# Patient Record
Sex: MALE | Race: WHITE | NOT HISPANIC OR LATINO | Employment: OTHER | URBAN - METROPOLITAN AREA
[De-identification: names, ages, dates, MRNs, and addresses within clinical notes are randomized per-mention and may not be internally consistent; named-entity substitution may affect disease eponyms.]

---

## 2024-10-17 ENCOUNTER — OFFICE VISIT (OUTPATIENT)
Age: 44
End: 2024-10-17
Payer: COMMERCIAL

## 2024-10-17 ENCOUNTER — APPOINTMENT (OUTPATIENT)
Age: 44
End: 2024-10-17
Payer: COMMERCIAL

## 2024-10-17 VITALS
HEIGHT: 70 IN | BODY MASS INDEX: 25.05 KG/M2 | HEART RATE: 83 BPM | DIASTOLIC BLOOD PRESSURE: 87 MMHG | SYSTOLIC BLOOD PRESSURE: 127 MMHG | WEIGHT: 175 LBS

## 2024-10-17 DIAGNOSIS — M47.816 LUMBAR SPONDYLOSIS: ICD-10-CM

## 2024-10-17 DIAGNOSIS — S39.92XA INJURY OF LOW BACK, INITIAL ENCOUNTER: ICD-10-CM

## 2024-10-17 DIAGNOSIS — M54.50 ACUTE BILATERAL LOW BACK PAIN WITHOUT SCIATICA: ICD-10-CM

## 2024-10-17 DIAGNOSIS — M54.50 ACUTE BILATERAL LOW BACK PAIN WITHOUT SCIATICA: Primary | ICD-10-CM

## 2024-10-17 PROCEDURE — 99204 OFFICE O/P NEW MOD 45 MIN: CPT | Performed by: STUDENT IN AN ORGANIZED HEALTH CARE EDUCATION/TRAINING PROGRAM

## 2024-10-17 PROCEDURE — 72100 X-RAY EXAM L-S SPINE 2/3 VWS: CPT

## 2024-10-17 RX ORDER — METHYLPREDNISOLONE 4 MG/1
TABLET ORAL
Qty: 1 EACH | Refills: 0 | Status: SHIPPED | OUTPATIENT
Start: 2024-10-17

## 2024-10-17 RX ORDER — LIDOCAINE 50 MG/G
1 PATCH TOPICAL DAILY
Qty: 30 PATCH | Refills: 0 | Status: SHIPPED | OUTPATIENT
Start: 2024-10-17 | End: 2024-11-16

## 2024-10-17 RX ORDER — CYCLOBENZAPRINE HCL 5 MG
5 TABLET ORAL
Qty: 30 TABLET | Refills: 1 | Status: SHIPPED | OUTPATIENT
Start: 2024-10-17

## 2024-10-17 NOTE — PROGRESS NOTES
"1. Acute bilateral low back pain without sciatica  XR spine lumbar 2 or 3 views injury    MRI lumbar spine wo contrast    Ambulatory Referral to Physical Therapy    methylPREDNISolone 4 MG tablet therapy pack    cyclobenzaprine (FLEXERIL) 5 mg tablet    lidocaine (LIDODERM) 5 %    Ambulatory Referral to Chiropractic    XR facial bones 3+ vw      2. Lumbar spondylosis  MRI lumbar spine wo contrast    Ambulatory Referral to Physical Therapy    methylPREDNISolone 4 MG tablet therapy pack    cyclobenzaprine (FLEXERIL) 5 mg tablet    lidocaine (LIDODERM) 5 %    Ambulatory Referral to Chiropractic    XR facial bones 3+ vw      3. Injury of low back, initial encounter  MRI lumbar spine wo contrast    Ambulatory Referral to Physical Therapy    methylPREDNISolone 4 MG tablet therapy pack    cyclobenzaprine (FLEXERIL) 5 mg tablet    lidocaine (LIDODERM) 5 %    Ambulatory Referral to Chiropractic    XR facial bones 3+ vw        Orders Placed This Encounter   Procedures    XR spine lumbar 2 or 3 views injury    MRI lumbar spine wo contrast    XR facial bones 3+ vw    Ambulatory Referral to Physical Therapy    Ambulatory Referral to Chiropractic        Imaging Studies (I personally reviewed images in PACS and report):    X-ray lumbar spine 10/17/2024: No acute osseous abnormalities.  Dissipates narrowing of L5-S1 there does also appear to be to space narrowing and anterior vertebral body of T11-T12  X-ray lumbar spine 7/31/2022: Via [facility].  Only report is available noting vertebral bodies are normal in height and alignment.  The disc bases are normal.  No fracture subluxations.\"    IMPRESSION:  Acute on chronic midline and paralumbar back pain/axial low back pain/stiffness  Recently aggravated/injured 3 days ago from strenuous lifting and feeling a sharp severe pain of his midline and paralumbar back  Radiographs unremarkable other than mild to space narrowing of L5-S1  Suspected axial low back pain/degenerative herniated " "disc/arthritic pain of the low back  Neurologically intact of lower extremities on exam but does report numbness of his legs intermittently with activities and nonspecific distribution  Myofascial stiffness of his low back and hamstrings    Other factors:  Occupation does involve strenuous lifting/manual labor activities  Tobacco use disorder    PLAN:    Clinical exam and radiographic imaging reviewed with patient today, with impression as per above. I have discussed with the patient the pathophysiology of this diagnosis and reviewed how the examination correlates with this diagnosis.    Imaging obtained/reviewed as per above. I will follow up official radiology interpretation.  Prior imaging reviewed as noted above  Recommended conservative treatment at this time and did refer him to formal PT as well as chiropractic care for at least a minimum of 6 weeks.  Given his recent injury as well as an acute exacerbation on top of a chronic low back issue that has been present for many years I did order an MRI of his lumbar spine without contrast for further evaluation.  Regards to pain control I prescribed him a Medrol Dosepak.  I have also prescribed Flexeril 5 mg p.o. nightly.  I counseled not to take NSAIDs with Medrol but can start taking after completion if needed.  I also counseled use of acetaminophen, heat/ice therapy 20 minutes on/off.  I counseled in some circumstances if conservative treatments do not provide sufficient relief lumbar injections may be considered as well through pain management.  Patient states at this point he would consider this option/treatment modality as well.    Return for Follow up after MRI.      Portions of the record may have been created with voice recognition software. Occasional wrong word or \"sound a like\" substitutions may have occurred due to the inherent limitations of voice recognition software. Read the chart carefully and recognize, using context, where substitutions have " occurred.     CHIEF COMPLAINT:  Chief Complaint   Patient presents with    Spine - Pain, Numbness     Numbness when leg are extended, in the feet         HPI:  Paulie Tang is a 44 y.o. male  who presents for       Visit 10/17/2024 :  Initial evaluation of low back pain/injury:  Of note patient has a history of tobacco use disorder  Of note patient states he has had a history of multiple years of midline and paralumbar sharp/aching back pains.  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  He states he exacerbated/injured his low back this past Monday about 3 days ago from strenuous lifting.  He states the pain became much more severe and radiated across his low back to a point that he had difficulty sleeping and doing any range of motion movement of his low back.  He describes a sharp/aching pain of severe intensity with this pain scale of 10/10 at its worst.  He states only minimal/limited improvement from acetaminophen, NSAIDs, resting.  Denies any bowel/bladder incontinence.  Reports numbness and tingling when sitting in a nonspecific distribution.  Pain is further aggravated with any attempt to lift/push/pull or with lumbar flexion/extension.  Denies bowel/bladder incontinence.  He states in the past for his low back pain he has tried several treatment modalities including NSAIDs, acetaminophen, heat/ice therapy.  He states he did attend formal physical therapy for this issue more than a year ago but did not feel it was providing any relief and actually felt that his pain was worsening.  He had not seen chiropractic care but was open to seeing chiropractor if it was possible.      Medical, Surgical, Family, and Social History    History reviewed. No pertinent past medical history.  History reviewed. No pertinent surgical history.  Social History   Social History     Substance and Sexual Activity   Alcohol Use Yes     Social History     Substance and Sexual Activity   Drug  "Use Yes    Types: Marijuana     Social History     Tobacco Use   Smoking Status Every Day    Current packs/day: 1.00    Types: Cigarettes    Passive exposure: Current   Smokeless Tobacco Never     History reviewed. No pertinent family history.  No Known Allergies       Physical Exam  /87   Pulse 83   Ht 5' 10\" (1.778 m)   Wt 79.4 kg (175 lb)   BMI 25.11 kg/m²     Constitutional:  see vital signs  Gen: well-developed, normocephalic/atraumatic, well-groomed  Eyes: No inflammation or discharge of conjunctiva or lids; sclera clear   Pulmonary/Chest: Effort normal. No respiratory distress.     Ortho Exam  BACK EXAM:  Gait: normal, no trendelenberg gait, no antalgic gait    BACK TENDERNESS:  Spinous Processes: + L4/L5  Paraspinal Muscles: + Bilateral paralumbar  SI Joint: no  Sacrum: no    ROM: Limited in all directions as it aggravates midline and paralumbar back pain  Flexion: 30  Extension: 5  Lateral flexion: 30 b/l  Rotation: intact: 30 b/l    DERMATOMAL SENSATION:  L1: normal   L2: normal   L3: normal   L4: normal   L5: normal   S1: normal    STRENGTH (bilateral):  Knee Extension: 5/5  Knee Flexion: 5/5  Foot Dorsiflexion: 5/5  Great Toe Extension: 5/5  Foot Plantarflexion: 5/5  Hip Flexion: 5/5      REFLEXES:  Patellar: 2+ bilateral  Achilles: 2+ bilateral  Clonus: negative bilateral    BACK:   SUPINE STRAIGHT LEG: negative bilaterally but aggravates low back pain  Popliteal Angle: 45 bilaterally    HIP:  LOG ROLL: negative  DUNCAN: negative  FADIR: negative    Procedures        "

## 2024-10-17 NOTE — PATIENT INSTRUCTIONS
Patient Education     Herniated Disc Exercises   About this topic   A herniated disc causes pain, numbness, weakness, or tingling in the back or legs. Your back has discs in it that sit between the bones of your spine. These add cushion and allow movement. A herniated disc happens when the center of the disc pushes against the outer shell of the disc. The outer shell may break and the jelly material inside the disc leaks out. This jelly can bother nearby nerves. Most often, the disc pushes towards the back or sides of the spinal bones.  Some people have more problems when they bend forward. Other people have more problems when they bend backward. The exercises may be different based on your problem. This set of exercises is designed for someone whose problems get worse when bending forward.  General   Before starting with a program, ask your doctor if you are healthy enough to do these exercises. Your doctor may have you work with a chiropractor, , or physical therapist to make a safe exercise program to meet your needs.  Stretching Exercises   Stretching exercises keep your muscles flexible. They also stop them from getting tight. Start by doing each of these stretches 2 to 3 times. In order for your body to make changes, you will need to hold these stretches for 20 to 30 seconds. Try to do the stretches 2 to 3 times each day. Do all exercises slowly.  Elbow props on stomach ? Lie on your stomach, resting on your lower arms. Rise up on your elbows as high as you are able. Keep your hips on the floor. Then, lower your back and shoulders down.  Strengthening Exercises   Strengthening exercises keep your muscles firm and strong. Start by repeating each exercise 2 to 3 times. Work up to doing each exercise 10 times. Try to do the exercises 2 to 3 times each day. Do all exercises slowly.  Back bends standing ? Stand with feet slightly apart. Put your hands on your hips. Lean back and look towards the ceiling  until you feel a stretch. For a disc problem, you can do this exercise without holding it for 10 times in a row.  Prone press-ups ? Lie on your stomach with your arms bent and your hands near your shoulders. Raise your upper body by straightening your arms. Keep your hips on the floor. Hold 3 to 5 seconds, then lower back to the ground.  Leg lifts on stomach ? Lie on your stomach. Keeping the knee straight, lift one leg towards the ceiling. Hold 3 to 5 seconds, then lower back to the ground. Repeat with the other leg.  Alternate opposite arm and leg lifts on stomach ? Lie on your stomach. Extend your arms over your head so your elbows are by your ears. Keep your head aligned with your back and lift one arm and the opposite leg at the same time. Hold 3 to 5 seconds, then return to the start position. Repeat with the other arm and leg.             What will the results be?   Better strength and flexibility  Less back pain  Less leg pain  Better posture  Easier to walk and do other activities  Less numbness and tingling  Helpful tips   Stay active and work out to keep your muscles strong and flexible.  Keep a healthy weight to avoid putting too much stress on your spine. Eat a healthy diet to keep your muscles healthy.  Be sure you do not hold your breath when exercising. This can raise your blood pressure. If you tend to hold your breath, try counting out loud when exercising. If any exercise bothers you, stop right away.  Always warm up before stretching. Heated muscles stretch much easier than cool muscles. Stretching cool muscles can lead to injury.  Try walking or cycling at an easy pace for a few minutes to warm up your muscles. Do this again after exercising.  Activities like running or basketball may make this problem worse. Swimming and cycling may be better exercise choices if you have this problem.  Never bounce when doing stretches.  Doing exercises before a meal may be a good way to get into a  routine.  Apply ice after you exercise.  Exercise may be slightly uncomfortable, but you should not have sharp pains. If you do get sharp pains, stop what you are doing. If the sharp pains continue, call your doctor.  Last Reviewed Date   2021-08-30  Consumer Information Use and Disclaimer   This generalized information is a limited summary of diagnosis, treatment, and/or medication information. It is not meant to be comprehensive and should be used as a tool to help the user understand and/or assess potential diagnostic and treatment options. It does NOT include all information about conditions, treatments, medications, side effects, or risks that may apply to a specific patient. It is not intended to be medical advice or a substitute for the medical advice, diagnosis, or treatment of a health care provider based on the health care provider's examination and assessment of a patient’s specific and unique circumstances. Patients must speak with a health care provider for complete information about their health, medical questions, and treatment options, including any risks or benefits regarding use of medications. This information does not endorse any treatments or medications as safe, effective, or approved for treating a specific patient. [employer] and its affiliates disclaim any warranty or liability relating to this information or the use thereof. The use of this information is governed by the Terms of Use, available at https://www.[employer].com/en/know/clinical-effectiveness-terms   Copyright   Copyright © 2024 [employer] and its affiliates and/or licensors. All rights reserved.

## 2024-10-26 ENCOUNTER — HOSPITAL ENCOUNTER (OUTPATIENT)
Dept: MRI IMAGING | Facility: HOSPITAL | Age: 44
Discharge: HOME | End: 2024-10-26

## 2024-10-26 DIAGNOSIS — M54.50 ACUTE BILATERAL LOW BACK PAIN WITHOUT SCIATICA: ICD-10-CM

## 2024-10-26 DIAGNOSIS — S39.92XA INJURY OF LOW BACK, INITIAL ENCOUNTER: ICD-10-CM

## 2024-10-26 DIAGNOSIS — M47.816 LUMBAR SPONDYLOSIS: ICD-10-CM

## 2024-11-02 ENCOUNTER — HOSPITAL ENCOUNTER (OUTPATIENT)
Dept: MRI IMAGING | Facility: HOSPITAL | Age: 44
Discharge: HOME | End: 2024-11-02
Payer: COMMERCIAL

## 2024-11-02 DIAGNOSIS — S39.92XA INJURY OF LOW BACK, INITIAL ENCOUNTER: ICD-10-CM

## 2024-11-02 DIAGNOSIS — M54.50 ACUTE BILATERAL LOW BACK PAIN WITHOUT SCIATICA: ICD-10-CM

## 2024-11-02 DIAGNOSIS — M47.816 LUMBAR SPONDYLOSIS: ICD-10-CM

## 2024-11-02 PROCEDURE — 72148 MRI LUMBAR SPINE W/O DYE: CPT

## 2024-11-04 ENCOUNTER — OFFICE VISIT (OUTPATIENT)
Age: 44
End: 2024-11-04
Payer: COMMERCIAL

## 2024-11-04 VITALS
SYSTOLIC BLOOD PRESSURE: 126 MMHG | HEIGHT: 70 IN | HEART RATE: 84 BPM | DIASTOLIC BLOOD PRESSURE: 86 MMHG | WEIGHT: 174 LBS | BODY MASS INDEX: 24.91 KG/M2

## 2024-11-04 DIAGNOSIS — M46.1 SI JOINT ARTHRITIS (HCC): ICD-10-CM

## 2024-11-04 DIAGNOSIS — M54.50 ACUTE BILATERAL LOW BACK PAIN WITHOUT SCIATICA: Primary | ICD-10-CM

## 2024-11-04 DIAGNOSIS — M47.816 LUMBAR SPONDYLOSIS: ICD-10-CM

## 2024-11-04 PROCEDURE — 99213 OFFICE O/P EST LOW 20 MIN: CPT | Performed by: STUDENT IN AN ORGANIZED HEALTH CARE EDUCATION/TRAINING PROGRAM

## 2024-11-04 RX ORDER — DICLOFENAC SODIUM 75 MG/1
75 TABLET, DELAYED RELEASE ORAL EVERY 12 HOURS PRN
Qty: 60 TABLET | Refills: 0 | Status: SHIPPED | OUTPATIENT
Start: 2024-11-04

## 2024-11-04 NOTE — PROGRESS NOTES
"1. Acute bilateral low back pain without sciatica  diclofenac (VOLTAREN) 75 mg EC tablet    Ambulatory referral to Spine & Pain Management      2. Lumbar spondylosis  diclofenac (VOLTAREN) 75 mg EC tablet    Ambulatory referral to Spine & Pain Management      3. SI joint arthritis (HCC)  diclofenac (VOLTAREN) 75 mg EC tablet    Ambulatory referral to Spine & Pain Management          Orders Placed This Encounter   Procedures    Ambulatory referral to Spine & Pain Management        Imaging Studies (I personally reviewed images in PACS and report):    MRI lumbar spine without contrast 11/3/2024:  1. Small disc herniations at L4-5 and L5-S1 result in mild bilateral subarticular stenosis. No central stenosis seen.  2. Moderate right SI joint arthropathy with marrow signal changes and a bridging osteophyte.    X-ray lumbar spine 10/17/2024: No acute osseous abnormalities.  Dissipates narrowing of L5-S1 there does also appear to be to space narrowing and anterior vertebral body of T11-T12  X-ray lumbar spine 7/31/2022: Via [facility].  Only report is available noting vertebral bodies are normal in height and alignment.  The disc bases are normal.  No fracture subluxations.\"    IMPRESSION:  Acute on chronic midline and paralumbar back pain/axial low back pain/stiffness  Recently aggravated/injured 3 days ago from strenuous lifting and feeling a sharp severe pain of his midline and paralumbar back  Radiographs unremarkable other than mild to space narrowing of L5-S1  Suspected axial low back pain/degenerative herniated disc/arthritic pain of the low back  Neurologically intact of lower extremities on exam but does report numbness of his legs intermittently with activities and nonspecific distribution  Myofascial stiffness of his low back and hamstrings  MRI revealing small disc herniations of L4-L5 and L5-S1 with bilateral mild foraminal stenosis.  Incidentally there is a right SI joint osteoarthritis seen as well  Brief relief " "from Medrol Dosepak but otherwise did not find relief from OTC NSAIDs, CMFN, muscle relaxers, lidocaine patching.    Other factors:  Occupation does involve strenuous lifting/manual labor activities  Tobacco use disorder    PLAN:    Clinical exam and radiographic imaging reviewed with patient today, with impression as per above. I have discussed with the patient the pathophysiology of this diagnosis and reviewed how the examination correlates with this diagnosis.  .  MRI reviewed as noted above.  I counseled there were no findings to suggest absolute surgical intervention at this time  I counseled continued conservative treatment modalities and given he reports limited to no relief thus far I have referred him to spine and pain management to consider lumbar versus SI joint injections.  Patient does have referral to chiropractor previously that he can attend going forward as patient states in the past he did not have relief from formal PT.  In regards to pain management I have prescribed him diclofenac 75 mg p.o. twice daily as needed and counseled he can continue use of acetaminophen, muscle relaxers, lidocaine patching concurrently along with heat/ice therapy.    Return for Follow up with pain management for further recommendations.      Portions of the record may have been created with voice recognition software. Occasional wrong word or \"sound a like\" substitutions may have occurred due to the inherent limitations of voice recognition software. Read the chart carefully and recognize, using context, where substitutions have occurred.     CHIEF COMPLAINT:  Chief Complaint   Patient presents with    Spine - Follow-up         HPI:  Paulie Tang is a 44 y.o. male  who presents for     Visit 11/4/2024:  Follow-up evaluation of low back pain/injury  History as per below  MRI of the lumbar spine obtained since last visit and reviewed as noted above  Patient reports no improvement since last visit  He states there was " "about a day or 2 of relief when on the steroid but the pain slowly recurred again  Pain again is along the midline and paralumbar aspect of his back.  He describes it as someone \"stabbing him\" and is aggravated with any range of motion movement of his back.  Pain is of moderate to severe intensity.  Denies bowel/bladder incontinence.  Reports N/T of lower extremities in unspecified distribution.  Pain still interferes with sleep at night despite use of muscle relaxers.  Denies relief from lidocaine patching..    Patient has held off scheduling with chiropractor until MRI was read today to determine if surgical intervention was needed.    Visit 10/17/2024 :  Initial evaluation of low back pain/injury:  Of note patient has a history of tobacco use disorder  Of note patient states he has had a history of multiple years of midline and paralumbar sharp/aching back pains.  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  He states he exacerbated/injured his low back this past Monday about 3 days ago from strenuous lifting.  He states the pain became much more severe and radiated across his low back to a point that he had difficulty sleeping and doing any range of motion movement of his low back.  He describes a sharp/aching pain of severe intensity with this pain scale of 10/10 at its worst.  He states only minimal/limited improvement from acetaminophen, NSAIDs, resting.  Denies any bowel/bladder incontinence.  Reports numbness and tingling when sitting in a nonspecific distribution.  Pain is further aggravated with any attempt to lift/push/pull or with lumbar flexion/extension.  Denies bowel/bladder incontinence.  He states in the past for his low back pain he has tried several treatment modalities including NSAIDs, acetaminophen, heat/ice therapy.  He states he did attend formal physical therapy for this issue more than a year ago but did not feel it was providing any relief and actually " "felt that his pain was worsening.  He had not seen chiropractic care but was open to seeing chiropractor if it was possible.      Medical, Surgical, Family, and Social History    History reviewed. No pertinent past medical history.  History reviewed. No pertinent surgical history.  Social History   Social History     Substance and Sexual Activity   Alcohol Use Yes     Social History     Substance and Sexual Activity   Drug Use Yes    Types: Marijuana     Social History     Tobacco Use   Smoking Status Every Day    Current packs/day: 1.00    Types: Cigarettes    Passive exposure: Current   Smokeless Tobacco Never     History reviewed. No pertinent family history.  No Known Allergies       Physical Exam  /86   Pulse 84   Ht 5' 10\" (1.778 m)   Wt 78.9 kg (174 lb)   BMI 24.97 kg/m²     Constitutional:  see vital signs  Gen: well-developed, normocephalic/atraumatic, well-groomed  Eyes: No inflammation or discharge of conjunctiva or lids; sclera clear   Pulmonary/Chest: Effort normal. No respiratory distress.     Ortho Exam  BACK EXAM:  Gait: normal, no trendelenberg gait, no antalgic gait    BACK TENDERNESS:  Spinous Processes: + L4/L5  Paraspinal Muscles: + Bilateral paralumbar  SI Joint: +right  Sacrum: no    ROM: Limited in all directions as it aggravates midline and paralumbar back pain  Flexion: 30  Extension: 5  Lateral flexion: 30 b/l  Rotation: intact: 30 b/l    DERMATOMAL SENSATION:  L1: normal   L2: normal   L3: normal   L4: normal   L5: normal   S1: normal    Procedures        "

## 2024-11-11 ENCOUNTER — OFFICE VISIT (OUTPATIENT)
Age: 44
End: 2024-11-11
Payer: COMMERCIAL

## 2024-11-11 VITALS
OXYGEN SATURATION: 94 % | HEART RATE: 103 BPM | DIASTOLIC BLOOD PRESSURE: 80 MMHG | HEIGHT: 70 IN | WEIGHT: 174 LBS | SYSTOLIC BLOOD PRESSURE: 126 MMHG | BODY MASS INDEX: 24.91 KG/M2

## 2024-11-11 DIAGNOSIS — M79.18 MYOFASCIAL PAIN: Primary | ICD-10-CM

## 2024-11-11 DIAGNOSIS — M99.02 SEGMENTAL DYSFUNCTION OF THORACIC REGION: ICD-10-CM

## 2024-11-11 DIAGNOSIS — M47.816 LUMBAR SPONDYLOSIS: ICD-10-CM

## 2024-11-11 DIAGNOSIS — S39.92XA INJURY OF LOW BACK, INITIAL ENCOUNTER: ICD-10-CM

## 2024-11-11 DIAGNOSIS — M53.3 SACROILIAC JOINT DYSFUNCTION: ICD-10-CM

## 2024-11-11 DIAGNOSIS — M54.16 RIGHT LUMBAR RADICULITIS: ICD-10-CM

## 2024-11-11 DIAGNOSIS — M54.50 ACUTE BILATERAL LOW BACK PAIN WITHOUT SCIATICA: ICD-10-CM

## 2024-11-11 DIAGNOSIS — M99.03 SEGMENTAL DYSFUNCTION OF LUMBAR REGION: ICD-10-CM

## 2024-11-11 PROCEDURE — 99203 OFFICE O/P NEW LOW 30 MIN: CPT | Performed by: CHIROPRACTOR

## 2024-11-11 NOTE — PROGRESS NOTES
"     1. Myofascial pain        2. Sacroiliac joint dysfunction        3. Segmental dysfunction of lumbar region        4. Segmental dysfunction of thoracic region        5. Right lumbar radiculitis        6. Acute bilateral low back pain without sciatica  Ambulatory Referral to Chiropractic      7. Injury of low back, initial encounter  Ambulatory Referral to Chiropractic      8. Lumbar spondylosis  Ambulatory Referral to Chiropractic        Assessment/Plan:    Review of Diagnostic Studies and Office Notes:    The patient's November 2, 2024 MRI report of the lumbar spine, October 17, 2024 x-ray report and films of the lumbar spine, July 31, 2022 x-ray report of the lumbar spine, Morgan Allison MD November 4, 2024 orthopedic office note (acute bilateral lower back pain without sciatica, lumbar spondylosis, sacroiliac joint arthritis, \"? Acute on chronic midline and paralumbar back pain/axial low back pain/stiffness, ? Recently aggravated/injured 3 days ago from strenuous lifting and feeling a sharp severe pain of his midline and paralumbar back, ? Radiographs unremarkable other than mild to space narrowing of L5-S1  ? Suspected axial low back pain/degenerative herniated disc/arthritic pain of the low back,? Neurologically intact of lower extremities on exam but does report numbness of his legs intermittently with activities and nonspecific distribution,? Myofascial stiffness of his low back and hamstrings,? MRI revealing small disc herniations of L4-L5 and L5-S1 with bilateral mild foraminal stenosis.  Incidentally there is a right SI joint osteoarthritis seen as well,? Brief relief from Medrol Dosepak but otherwise did not find relief from OTC NSAIDs, CMFN, muscle relaxers, lidocaine patching.\"  Referral for chiropractic care, did not have relief from formal PT in the past), and October 17, 2024 orthopedic office note (numbness when legs are extended, and the feet, injured his back this past Monday, 3 days ago from " strenuous lifting, radiated across his lower back, describes sharp/aching pain of severe intensity minimal and limited improvement with acetaminophen, attended physical therapy for this issue more than a year ago but did not feel it was providing any relief, felt the pain was worsening, has not had chiropractic care)   were reviewed prior to the chiropractic evaluation today.    Narrative & Impression   MRI LUMBAR SPINE WITHOUT CONTRAST     INDICATION: M54.50: Low back pain, unspecified  M47.816: Spondylosis without myelopathy or radiculopathy, lumbar region  S39.92XA: Unspecified injury of lower back, initial encounter.   Lifting injury     COMPARISON: Radiograph dated 10/17/2024     TECHNIQUE:  Multiplanar, multisequence imaging of the lumbar spine was performed. .        IMAGE QUALITY:  Diagnostic     FINDINGS:     VERTEBRAL BODIES:  There are 5 lumbar type vertebral bodies.  Normal alignment of the lumbar spine.  No spondylolysis or spondylolisthesis. No scoliosis.  No compression fracture.    Degenerative endplate marrow changes are present. No aggressive osseous   lesion seen.     SACRUM:  Normal signal within the sacrum. No evidence of insufficiency or stress fracture.     DISTAL CORD AND CONUS:  Normal size and signal within the distal cord and conus.     PARASPINAL SOFT TISSUES:  Paraspinal soft tissues are unremarkable.     LOWER THORACIC DISC SPACES:  Mild noncompressive lower thoracic degenerative change.     LUMBAR DISC SPACES:     L1-L2:  Normal.     L2-L3:  Normal.     L3-L4: Modic type II endplate change. There is no stenosis.     L4-L5: Small central extrusion with annular fissure results in mild bilateral subarticular stenosis. No central or foraminal stenosis seen. Facet arthropathy is mild.     L5-S1: Small central extrusion results in mild bilateral subarticular stenosis. There is no central or foraminal stenosis. Vacuum disc is suspected.     OTHER FINDINGS: Moderate right SI joint arthropathy  is noted with a prominent bridging osteophyte.     IMPRESSION:     1. Small disc herniations at L4-5 and L5-S1 result in mild bilateral subarticular stenosis. No central stenosis seen.  2. Moderate right SI joint arthropathy with marrow signal changes and a bridging osteophyte.        Workstation performed: [id number]     Narrative & Impression         LUMBAR SPINE     INDICATION:   Low back pain, unspecified.      COMPARISON:  None.     VIEWS:  XR SPINE LUMBAR 2 OR 3 VIEWS INJURY  Images: 3     FINDINGS:     There are 5 non rib bearing lumbar vertebral bodies.      There is no evidence of acute fracture or destructive osseous lesion.     Alignment is unremarkable.      Disc height loss L5-S1.     The pedicles appear intact.     Soft tissues are unremarkable.     IMPRESSION:        No acute osseous abnormality.       Degenerative changes as described.     Electronically signed: 10/17/2024 10:07 AM Ian Renteria, MPH,MD     Impression:  1.  No acute fracture or dislocation.          Workstation:[id number]  Narrative    HISTORY: . lower back pain    MRN:  [id number]        DATE OF SERVICE:  7/31/2022 4:10 AM                          EXAM DESCRIPTION:  XR LUMBAR SPINE COMPLETE 4+ VW      COMPARISON:  No relevant recent priors.     Technique: 5 views of the lumbar spine were obtained.    Findings:  Vertebral bodies are normal in height and alignment. The disc spaces are normal.  No fracture or subluxation is seen.  Exam End: 07/31/22  2:40 AM    Specimen Collected: 07/31/22  7:45 AM Last Resulted: 07/31/22  7:46 AM   Received From: [facility]  Result Received: 10/17/24  7:58 AM       I reviewed the patient's October 17, 2024 x-ray films of the lumbar spine.    My clinical impression:  [place] DDD at the L5-S1 level at least moderate DDD at the T11-12 level with mild to moderate osteoarthritic spurring on the anterior portion of the adjacent endplates    Medical Decision Making and Treatment Plan:    The  patient has an occupation that requires frequent and prolonged bending in low headspace areas.  Bending at the waist requires flexibility in the hamstrings in order to avoid stress of the lumbar region.  The patient has at least moderate inflexibility in the bilateral hamstrings but also has positive nerve root tension signs on the right side.  The patient does not have right lower extremity symptoms.  Instead he has paresthesias in the bilateral feet with prolonged sitting which may be due to entrapment of the sciatic nerve at the bilateral piriformis.  The patient was taught a gluteus medius/piriformis stretch to assist with the feet paresthesias.  I am unable to prescribe the patient hamstring stretching exercises as he may be putting tension on the right sciatic nerve during a right hamstring stretch.  Instead I will address the entrapment sites of the sciatic nerve in the right lower extremity.  Once those entrapment sites are released he will then be provided hamstring stretching exercises. The patient has myofascial findings as well as a right-sided sacroiliac joint restriction lower thoracic facet restrictions, and lumbar facet restrictions which can contribute to limited range of motion and pain.    The patient will be treated with conservative chiropractic care including myofascial release, joint mobilization, and a home stretching and icing program.    The patient was taught lower back and assisted/unassisted gluteus medius/piriformis stretch today. The patient was advised to do the stretch for 3 sets of 15-20 seconds several times per day.The need to stretch to the point of tension only was discussed. ROM was measured with an [employer] digital dual inclinometer.    The patient will initially be seen 2 times per week and the frequency of treatment will be reduced to 1 time per week as there are decreased symptoms and improvement in objective findings. The patient will then be discharged.    Patient  Instructions:    The patient was advised to apply ice to the region in 15-minute intervals a minimum of 3 times per day.   Avoid applying heat tot the regions.  Proper lifting and bending postures were reviewed with the patient.  Try to squat in low ceiling areas.    Goals:    JMLGOALS: Improve lumbar range of motion and improve trunk flexion    TIME/Reviewed with Patient:  During the consultation the following was included: The patient's history/current complaints, physical exam, reviewing the office notes/diagnostic reports, reviewing the patient's x-ray films of the lumbar spine reviewing home instruction/reducing risk factors with the patient, reviewing my findings/progress with the patient, and discussing the treatment/treatment plan with the patient.    Subjective:      Paulie Tang is a 44 y.o. male who presents today with pain in the bilateral lower back (superior to the iliac crest region).  On his pain diagram he also indicated experiencing pain in the midline mid to lower thoracic region.  The patient confirmed that he is under the care of Morgan Washington MD and was referred for chiropractic evaluation today.  The patient stated the pain is equal in intensity on the left and right.  The patient stated that, during an exacerbation of back pain, he will experience pain into the bilateral groin region.  According to his intake paperwork the patient's lower back pain has been present for 3 years.  However he had an exacerbation of lower back pain in October 2024.  The patient confirmed that he had physical therapy in the past but did not notice relief.  The patient has paresthesias in his feet if he is sitting for prolonged period of time.  Otherwise he denied experiencing pain or paresthesias in the lower extremities.  He stated he has increased pain in the bilateral lower back region with bending forward type movements and decreased lower back pain with rest.  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  He stated that his lower back will \"lock up\" when lying on his side while working in attics.  The patient described the lower back pain as \"feeling like I am being stabbed.\"  He denied having changes in bowel or bladder function.  On his intake paperwork he describes his current pain level as a 4 on a 0-10 pain scale.  He described this pain level as a 10 on a 0-10 pain scale at its worst.    Objective:    Vitals:    11/11/24 0751   BP: 126/80   Pulse: 103   SpO2: 94%   Weight: 78.9 kg (174 lb)   Height: 5' 10\" (1.778 m)       Appearance: Well developed, well nourished, and in no acute distress    Alert and oriented x 3    Mood/Affect: calm and pleasant    Gait/Posture:    Gait: Normal    Antalgic posture: None    Lordosis: Lumbar- normal    Kyphosis: Thoracic- normal    Lower extremity reflexes:    Deep tendon reflexes were normal and symmetrical in the bilateral lower extremities.    Lower Extremity Muscle Testing:    Muscle testing of the bilateral lower extremities was normal and graded +5/5.    Sensory:    Sensation to light touch was normal and symmetrical in the bilateral lower extremities.    Babinski was negative bilaterally.    Lumbar Orthopedic Tests:    Seated Straight Leg Raise was positive on the Right for right medial posterior pain and mild right lateral thigh discomfort.    Seated Straight Leg Raise was negative  on the Left.    Supine Straight Leg Raise was positive on the Right.    Supine Straight Leg Raise was negative  on the Left. Moderate inflexibility in the bilateral hamstrings with the right side being more limited when compared to the left.    Bragard's Test was positive on right for increased right thigh discomfort in the seated and supine positions.    Jesus Test was negative  bilaterally.    Active Lumbar Ranges of Motion:    Flexion: Allowed the patient to touch the distal 1/4 of the thigh to proximal " knee    Extension: 8 degrees    Right lateral flexion: 25 degrees    Left Lateral flexion: 27 degrees    Palpation:    Negative Derefield on the right. Active myofascial trigger points were present in the bilateral thoracic erector spinae, bilateral thoracolumbar erector spinae, lumbar erector spinae, quadratus lumborum, and gluteus medius. Pressure to the above listed trigger points reproduced some of the pain described in today's chief complaint. Intersegmental motion was decreased in the thoracic spine including joint dysfunctions at T6-7, T7-8, and T10-11. Intersegmental motion was decreased in the lumbar spine including joint dysfunctions at L1-2, L4-5, and L5-S1. Intersegmental motion was decreased in the right sacroiliac joint.       Dictation voice to text software has been used in the creation of this document. Please consider this in light of any contextual or grammatical errors.

## 2024-11-11 NOTE — PATIENT INSTRUCTIONS
The patient was advised to apply ice to the region in 15-minute intervals a minimum of 3 times per day.   Avoid applying heat tot the regions.  Proper lifting and bending postures were reviewed with the patient.  Try to squat in low ceiling regions.

## 2024-11-15 ENCOUNTER — PROCEDURE VISIT (OUTPATIENT)
Age: 44
End: 2024-11-15
Payer: COMMERCIAL

## 2024-11-15 VITALS
DIASTOLIC BLOOD PRESSURE: 68 MMHG | HEIGHT: 70 IN | WEIGHT: 174 LBS | HEART RATE: 73 BPM | OXYGEN SATURATION: 95 % | SYSTOLIC BLOOD PRESSURE: 102 MMHG | BODY MASS INDEX: 24.91 KG/M2

## 2024-11-15 DIAGNOSIS — M99.02 SEGMENTAL DYSFUNCTION OF THORACIC REGION: ICD-10-CM

## 2024-11-15 DIAGNOSIS — M79.18 MYOFASCIAL PAIN: Primary | ICD-10-CM

## 2024-11-15 DIAGNOSIS — M53.3 SACROILIAC JOINT DYSFUNCTION: ICD-10-CM

## 2024-11-15 DIAGNOSIS — M54.16 RIGHT LUMBAR RADICULITIS: ICD-10-CM

## 2024-11-15 DIAGNOSIS — M99.03 SEGMENTAL DYSFUNCTION OF LUMBAR REGION: ICD-10-CM

## 2024-11-15 PROCEDURE — 98941 CHIROPRACT MANJ 3-4 REGIONS: CPT | Performed by: CHIROPRACTOR

## 2024-11-15 NOTE — PROGRESS NOTES
Assessment:  Today is the patient's first chiropractic treatment visit.  The patient was informed he may experience additional soreness following the today's treatment visit. The need to continue with the stretching exercises and apply ice in 15-minute intervals was discussed with the patient.  The patient was advised to make sure he is not recruiting his hip flexors and adductor muscles by keeping his legs fully relaxed when doing the prescribed stretching exercises.  Otherwise he is prone to cramping or discomfort in the hip flexor and adductor musculature.  He indicated he understood.  He was advised to discontinue the stretch if the groin symptoms persist when doing the prescribed stretching exercises.    1. Myofascial pain        2. Sacroiliac joint dysfunction        3. Segmental dysfunction of lumbar region        4. Segmental dysfunction of thoracic region        5. Right lumbar radiculitis          Plan:  Treatment today consisted of myofascial release via Graston Technique to the bilateral thoracolumbar/lumbar erector spinae and quadratus lumborum (superficial). GT 3 was used.    Manipulation was performed to the right sacroiliac joint via Montes low force drop technique. Contact was made to the right ischial tuberosity and the left lateral aspect of the  apex of the sacrum. Manipulation was performed to the lumbar restrictions via manual lumbar flexion distraction technique. Manipulation was performed to the lower thoracic restrictions via grade 1 mobilization techniques in the prone position.    Subjective:  The patient stated that he had significant pain in the bilateral groin region following his initial chiropractic evaluation.  He stated it felt as though he had been walking for an entire day.  He stated he was only able to relieve the pain in the bilateral groin region with lying down.  Today the patient indicated having pain in the bilateral lower back (superior to the iliac crest region).  He  also has pain in the midline mid to lower thoracic region. The patient has paresthesias in his feet if he is sitting for prolonged period of time. He has increased pain in the bilateral lower back region with bending forward type movements and decreased lower back pain with rest. He describes his current pain level as a 0 on a 0-10 pain scale at the time of today's visit.    Objective:  Negative Derefield on the right. Active myofascial trigger points were present in the bilateral thoracic erector spinae, bilateral thoracolumbar erector spinae, lumbar erector spinae, quadratus lumborum, and gluteus medius. Intersegmental motion was decreased in the thoracic spine including joint dysfunctions at T6-7, T7-8, and T10-11. Intersegmental motion was decreased in the lumbar spine including joint dysfunctions at L1-2, L4-5, and L5-S1. Intersegmental motion was decreased in the right sacroiliac joint.       I have used the Epic copy/forward function to compose this note.  I have reviewed my current note to ensure it reflects the current patient status, exam, assessment and plan.    Dictation voice to text software has been used in the creation of this document. Please consider this in light of any contextual or grammatical errors.

## 2024-11-22 ENCOUNTER — CONSULT (OUTPATIENT)
Dept: PAIN MEDICINE | Facility: CLINIC | Age: 44
End: 2024-11-22
Payer: COMMERCIAL

## 2024-11-22 VITALS — HEIGHT: 70 IN | WEIGHT: 174 LBS | BODY MASS INDEX: 24.91 KG/M2

## 2024-11-22 DIAGNOSIS — M47.816 LUMBAR SPONDYLOSIS: ICD-10-CM

## 2024-11-22 DIAGNOSIS — M46.1 SI JOINT ARTHRITIS (HCC): ICD-10-CM

## 2024-11-22 DIAGNOSIS — M51.26 LUMBAR DISC DISPLACEMENT WITHOUT MYELOPATHY: Primary | ICD-10-CM

## 2024-11-22 PROCEDURE — 99244 OFF/OP CNSLTJ NEW/EST MOD 40: CPT | Performed by: ANESTHESIOLOGY

## 2024-11-22 NOTE — PROGRESS NOTES
Assessment  1. Lumbar disc displacement without myelopathy    2. Lumbar spondylosis    3. SI joint arthritis (HCC)      Patient presenting with chronic back pain for greater than 1 year, worsening over the past month.  Pain is seemingly multifactorial with components of lumbar disc displacement, lumbar spondylosis, myofascial pain, sacroiliac joint dysfunction accompanied by pain at times 7/10 on the pain scale with inability to participate in IADLs for >6 weeks.  Patient has tried Tylenol, NSAIDs, muscle relaxants, lidocaine patches, oral steroids with modest benefit.    Denies any bowel or bladder incontinence, saddle anesthesia.    In regards to the patient's  pathology, we discussed the various treatment options including physical therapy, chiropractic treatment, medication management, activity modifications, interventional spine procedures.     Independently reviewed and interpreted lumbar MRI - this showed small disc extrusions at L4-5 and L5-S1 as well as moderate right SIJ arthropathy. No significant central canal or foraminal stenosis.    Plan:    Discussed LESI versus SIJ injections as needed going forward. His significant pain stemming from a lifting injury a month ago has been improving this week.     Patient can contact our office going forward when he has significantly worsening pain symptoms.    Recommended to continue chiropractic therapy as this time and taking as needed medications for pain.    Reviewed external notes from chiropractic therapy, orthopedics offices for review and interpretation of recent and prior relevant medical histories, treatment recommendations, medication and/or interventional treatment responses.    Reviewed renal function, CBC prior to recommending, initiating, continuing and/or adjusting medications.  Reviewed hemoglobin A1c, renal function, CBC and/or PT/INR prior to discussing/offering interventional modalities.    [place] Prescription Drug Monitoring Program report  was reviewed and was appropriate     My impressions and treatment recommendations were discussed in detail with the patient who verbalized understanding and had no further questions.  Discharge instructions were provided. I personally saw and examined the patient and I agree with the above discussed plan of care.    History of Present Illness    Paulie Tang is a 44 y.o. male presenting for consultation at [facility] for exam and evaluation of chronic back pain for greater than 1 year, worsening over the past month and now improving. Pain started following a non work related injury. Over the past month, the intensity of pain has been Moderate. Pain is currently 0/10. Pain does interfere with age appropriate activities of daily living. Pain is intermittent, with no typical pattern throughout the day. Pain is described as burning, cramping, shooting, sharp with pins/needles. Patient denies weakness in the lower extremities. Assistance device used: None.    Treatments tried:   PT: no  Chiropractic therapy: yes, ongoing  Injections: no   Previous spine surgery: No    Anticoagulation: no    Medications tried:   Tylenol, NSAIDs, Flexeril, lidocaine patch, oral steroids    I have personally reviewed and/or updated the patient's past medical history, past surgical history, family history, social history, current medications, allergies, and vital signs today.     Review of Systems   Constitutional:  Negative for chills and fever.   HENT:  Negative for ear pain and sore throat.    Eyes:  Negative for pain and visual disturbance.   Respiratory:  Negative for cough and shortness of breath.    Cardiovascular:  Negative for chest pain and palpitations.   Gastrointestinal:  Negative for abdominal pain and vomiting.   Genitourinary:  Negative for dysuria and hematuria.   Musculoskeletal:  Negative for arthralgias and back pain.   Skin:  Negative for color change and rash.   Neurological:  Negative for  "seizures and syncope.   All other systems reviewed and are negative.      Patient Active Problem List   Diagnosis    Myofascial pain    Sacroiliac joint dysfunction    Segmental dysfunction of lumbar region    Segmental dysfunction of thoracic region    Right lumbar radiculitis       History reviewed. No pertinent past medical history.    History reviewed. No pertinent surgical history.    History reviewed. No pertinent family history.    Social History     Occupational History    Not on file   Tobacco Use    Smoking status: Every Day     Current packs/day: 1.00     Types: Cigarettes     Passive exposure: Current    Smokeless tobacco: Never   Vaping Use    Vaping status: Never Used   Substance and Sexual Activity    Alcohol use: Yes    Drug use: Yes     Types: Marijuana    Sexual activity: Not on file       Current Outpatient Medications on File Prior to Visit   Medication Sig    cyclobenzaprine (FLEXERIL) 5 mg tablet Take 1 tablet (5 mg total) by mouth daily at bedtime    methylPREDNISolone 4 MG tablet therapy pack Use as directed on package    diclofenac (VOLTAREN) 75 mg EC tablet Take 1 tablet (75 mg total) by mouth every 12 (twelve) hours as needed (for moderate to severe pain of low back)    lidocaine (LIDODERM) 5 % Apply 1 patch topically over 12 hours daily Remove & Discard patch within 12 hours or as directed by MD     No current facility-administered medications on file prior to visit.       No Known Allergies    Physical Exam    Ht 5' 10\" (1.778 m)   Wt 78.9 kg (174 lb)   BMI 24.97 kg/m²     Constitutional: normal, well developed, well nourished, alert, in no distress and non-toxic and no overt pain behavior.  Eyes: anicteric  HEENT: grossly intact  Neck: supple, symmetric, trachea midline and no masses   Pulmonary:even and unlabored  Cardiovascular:No edema or pitting edema present  Skin:Normal without rashes or lesions and well hydrated  Psychiatric:Mood and affect appropriate  Neurologic: Motor " function is grossly intact with no focal neurologic deficits   Musculoskeletal: Some discomfort with R Al's test.    Imaging  MRI lumbar spine:  FINDINGS:     VERTEBRAL BODIES:  There are 5 lumbar type vertebral bodies.  Normal alignment of the lumbar spine.  No spondylolysis or spondylolisthesis. No scoliosis.  No compression fracture.    Degenerative endplate marrow changes are present. No aggressive osseous   lesion seen.     SACRUM:  Normal signal within the sacrum. No evidence of insufficiency or stress fracture.     DISTAL CORD AND CONUS:  Normal size and signal within the distal cord and conus.     PARASPINAL SOFT TISSUES:  Paraspinal soft tissues are unremarkable.     LOWER THORACIC DISC SPACES:  Mild noncompressive lower thoracic degenerative change.     LUMBAR DISC SPACES:     L1-L2:  Normal.     L2-L3:  Normal.     L3-L4: Modic type II endplate change. There is no stenosis.     L4-L5: Small central extrusion with annular fissure results in mild bilateral subarticular stenosis. No central or foraminal stenosis seen. Facet arthropathy is mild.     L5-S1: Small central extrusion results in mild bilateral subarticular stenosis. There is no central or foraminal stenosis. Vacuum disc is suspected.     OTHER FINDINGS: Moderate right SI joint arthropathy is noted with a prominent bridging osteophyte.     IMPRESSION:     1. Small disc herniations at L4-5 and L5-S1 result in mild bilateral subarticular stenosis. No central stenosis seen.  2. Moderate right SI joint arthropathy with marrow signal changes and a bridging osteophyte.

## 2024-12-05 ENCOUNTER — TELEPHONE (OUTPATIENT)
Age: 44
End: 2024-12-05

## 2025-03-04 ENCOUNTER — OFFICE VISIT (OUTPATIENT)
Dept: PAIN MEDICINE | Facility: CLINIC | Age: 45
End: 2025-03-04
Payer: COMMERCIAL

## 2025-03-04 VITALS — BODY MASS INDEX: 24.91 KG/M2 | WEIGHT: 174 LBS | HEIGHT: 70 IN

## 2025-03-04 DIAGNOSIS — M54.16 LUMBAR RADICULOPATHY: Primary | ICD-10-CM

## 2025-03-04 DIAGNOSIS — M47.816 LUMBAR SPONDYLOSIS: ICD-10-CM

## 2025-03-04 PROCEDURE — 99214 OFFICE O/P EST MOD 30 MIN: CPT | Performed by: ANESTHESIOLOGY

## 2025-03-04 RX ORDER — METHYLPREDNISOLONE 4 MG/1
TABLET ORAL
Qty: 1 EACH | Refills: 0 | Status: SHIPPED | OUTPATIENT
Start: 2025-03-04 | End: 2025-03-13 | Stop reason: ALTCHOICE

## 2025-03-04 NOTE — PROGRESS NOTES
Assessment:  1. Lumbar radiculopathy    2. Lumbar spondylosis      Patient presenting for follow up visit. He has a history of chronic back pain for greater than 1 year, worsening over the past month.    Pain is likely multifactorial with components of lumbar disc displacement, lumbar spondylosis, myofascial pain, sacroiliac joint dysfunction accompanied by pain at times 7/10 on the pain scale with inability to participate in IADLs for >6 weeks.  Patient has tried Tylenol, NSAIDs, muscle relaxants, lidocaine patches, oral steroids with modest benefit.    Denies any bowel or bladder incontinence, saddle anesthesia.     In regards to the patient's  pathology, we discussed the various treatment options including physical therapy, chiropractic treatment, medication management, activity modifications, interventional spine procedures.      Independently reviewed and interpreted lumbar MRI - this showed small disc extrusions at L4-5 and L5-S1 as well as moderate right SIJ arthropathy. No significant central canal or foraminal stenosis.    Plan:     Discussed LESI versus SIJ injections as needed going forward. Today his pain is reported as worsening and radicular in nature.    Discussed and offered bilateral L4 TF ESIs.  The procedures, its risks, and benefits were explained in detail to the patient. Risks include but are not limited to bleeding, infection, hematoma formation, abscess formation, weakness, headache, failure the pain to improve, nerve irritation or damage, and potential worsening of the pain.  The approach was demonstrated using models and literature was provided. The patient verbalized understanding and wished to proceed with the procedure.     Start Medrol dosepak for the inflammatory component of his pain symptoms in the meantime.    Reviewed renal function, CBC prior to recommending, initiating, continuing and/or adjusting medications.    Reviewed hemoglobin A1c, renal function, CBC and/or PT/INR prior to  discussing/offering interventional modalities.     My impressions and treatment recommendations were discussed in detail with the patient who verbalized understanding and had no further questions.  Discharge instructions were provided. I personally saw and examined the patient and I agree with the above discussed plan of care.    Orders Placed This Encounter   Procedures    FL spine and pain procedure     Standing Status:   Future     Expected Date:   3/4/2025     Expiration Date:   3/4/2029     Reason for Exam::   bilateral L4 TF AMY     Anticoagulant hold needed?:   no     New Medications Ordered This Visit   Medications    methylPREDNISolone 4 MG tablet therapy pack     Sig: Use as directed on package     Dispense:  1 each     Refill:  0       History of Present Illness:  Paulie Tang is a 44 y.o. male who presents for a follow up office visit in regards to Back Pain.   The patient’s current symptoms include worsening back and radiating leg pain symptoms. Pain is rated 7/10 and is described as a constant burning, sharp, shooting pain throughout the day.    I have personally reviewed and/or updated the patient's past medical history, past surgical history, family history, social history, current medications, allergies, and vital signs today.     Review of Systems   Constitutional:  Negative for chills and fever.   HENT:  Negative for ear pain and sore throat.    Eyes:  Negative for pain and visual disturbance.   Respiratory:  Negative for cough and shortness of breath.    Cardiovascular:  Negative for chest pain and palpitations.   Gastrointestinal:  Negative for abdominal pain and vomiting.   Genitourinary:  Negative for dysuria and hematuria.   Musculoskeletal:  Positive for arthralgias, back pain, gait problem and myalgias.   Skin:  Negative for color change and rash.   Neurological:  Negative for seizures and syncope.   All other systems reviewed and are negative.      Patient Active Problem List   Diagnosis  "   Myofascial pain    Sacroiliac joint dysfunction    Segmental dysfunction of lumbar region    Segmental dysfunction of thoracic region    Right lumbar radiculitis       History reviewed. No pertinent past medical history.    History reviewed. No pertinent surgical history.    History reviewed. No pertinent family history.    Social History     Occupational History    Not on file   Tobacco Use    Smoking status: Every Day     Current packs/day: 1.00     Types: Cigarettes     Passive exposure: Current    Smokeless tobacco: Never   Vaping Use    Vaping status: Never Used   Substance and Sexual Activity    Alcohol use: Yes    Drug use: Yes     Types: Marijuana    Sexual activity: Not on file       Current Outpatient Medications on File Prior to Visit   Medication Sig    cyclobenzaprine (FLEXERIL) 5 mg tablet Take 1 tablet (5 mg total) by mouth daily at bedtime    diclofenac (VOLTAREN) 75 mg EC tablet Take 1 tablet (75 mg total) by mouth every 12 (twelve) hours as needed (for moderate to severe pain of low back)    lidocaine (LIDODERM) 5 % Apply 1 patch topically over 12 hours daily Remove & Discard patch within 12 hours or as directed by MD    [DISCONTINUED] methylPREDNISolone 4 MG tablet therapy pack Use as directed on package (Patient not taking: Reported on 3/4/2025)     No current facility-administered medications on file prior to visit.       No Known Allergies    Physical Exam:    Ht 5' 10\" (1.778 m)   Wt 78.9 kg (174 lb)   BMI 24.97 kg/m²     Constitutional:normal, well developed, well nourished, alert, in no distress and non-toxic and no overt pain behavior.  Eyes:anicteric  HEENT:grossly intact  Neck:supple, symmetric, trachea midline and no masses   Pulmonary:even and unlabored  Cardiovascular:No edema or pitting edema present  Skin:Normal without rashes or lesions and well hydrated  Psychiatric:Mood and affect appropriate  Neurologic: Motor function is grossly intact with no focal neurologic deficits "     Imaging

## 2025-03-12 ENCOUNTER — TELEPHONE (OUTPATIENT)
Age: 45
End: 2025-03-12

## 2025-03-12 ENCOUNTER — TELEPHONE (OUTPATIENT)
Dept: RADIOLOGY | Facility: MEDICAL CENTER | Age: 45
End: 2025-03-12

## 2025-03-12 NOTE — TELEPHONE ENCOUNTER
Caller: Pt    Doctor: Dr. Parks    Reason for call: Pt would like to know if he needs to take day off from work tomorrow for injection procedure. Pt has appt at 8 AM.        Call forwarded#: Allyson Rubio

## 2025-03-12 NOTE — TELEPHONE ENCOUNTER
On reminder call, patient asked if he can go to work after the injection. Please call to confirm. Patient is getting Bilateral L4 TFESI.   Also patient stated that he finished a steroid pack yesterday that Dr. Parks had prescribed him. TY

## 2025-03-13 ENCOUNTER — HOSPITAL ENCOUNTER (OUTPATIENT)
Dept: RADIOLOGY | Facility: MEDICAL CENTER | Age: 45
End: 2025-03-13
Payer: COMMERCIAL

## 2025-03-13 VITALS
OXYGEN SATURATION: 99 % | DIASTOLIC BLOOD PRESSURE: 84 MMHG | SYSTOLIC BLOOD PRESSURE: 125 MMHG | RESPIRATION RATE: 20 BRPM | TEMPERATURE: 97.7 F | HEART RATE: 68 BPM

## 2025-03-13 DIAGNOSIS — M54.16 LUMBAR RADICULOPATHY: ICD-10-CM

## 2025-03-13 PROCEDURE — 64483 NJX AA&/STRD TFRM EPI L/S 1: CPT | Performed by: ANESTHESIOLOGY

## 2025-03-13 RX ORDER — BUPIVACAINE HCL/PF 2.5 MG/ML
3 VIAL (ML) INJECTION ONCE
Status: COMPLETED | OUTPATIENT
Start: 2025-03-13 | End: 2025-03-13

## 2025-03-13 RX ORDER — METHYLPREDNISOLONE ACETATE 40 MG/ML
80 INJECTION, SUSPENSION INTRA-ARTICULAR; INTRALESIONAL; INTRAMUSCULAR; PARENTERAL; SOFT TISSUE ONCE
Status: COMPLETED | OUTPATIENT
Start: 2025-03-13 | End: 2025-03-13

## 2025-03-13 RX ADMIN — IOHEXOL 2 ML: 300 INJECTION, SOLUTION INTRAVENOUS at 08:15

## 2025-03-13 RX ADMIN — BUPIVACAINE HYDROCHLORIDE 3 ML: 2.5 INJECTION, SOLUTION EPIDURAL; INFILTRATION; INTRACAUDAL at 08:15

## 2025-03-13 RX ADMIN — METHYLPREDNISOLONE ACETATE 80 MG: 40 INJECTION, SUSPENSION INTRA-ARTICULAR; INTRALESIONAL; INTRAMUSCULAR; SOFT TISSUE at 08:15

## 2025-03-13 NOTE — H&P
History of Present Illness: The patient is a 44 y.o. male who presents with complaints of back and leg pain    No past medical history on file.    No past surgical history on file.      Current Outpatient Medications:     cyclobenzaprine (FLEXERIL) 5 mg tablet, Take 1 tablet (5 mg total) by mouth daily at bedtime, Disp: 30 tablet, Rfl: 1    diclofenac (VOLTAREN) 75 mg EC tablet, Take 1 tablet (75 mg total) by mouth every 12 (twelve) hours as needed (for moderate to severe pain of low back), Disp: 60 tablet, Rfl: 0    lidocaine (LIDODERM) 5 %, Apply 1 patch topically over 12 hours daily Remove & Discard patch within 12 hours or as directed by MD, Disp: 30 patch, Rfl: 0    Current Facility-Administered Medications:     bupivacaine (PF) (MARCAINE) 0.25 % injection 3 mL, 3 mL, Epidural, Once, Will Teresa Parks MD    iohexol (OMNIPAQUE) 300 mg/mL injection 2 mL, 2 mL, Epidural, Once, Will Teresa Parks MD    methylPREDNISolone acetate (DEPO-MEDROL) injection 80 mg, 80 mg, Epidural, Once, Will Teresa Parks MD    No Known Allergies    Physical Exam:   Vitals:    03/13/25 0758   BP: 134/91   Pulse: 67   Resp: 20   Temp: 97.7 °F (36.5 °C)   SpO2: 99%     General: Awake, Alert, Oriented x 3, Mood and affect appropriate  Respiratory: Respirations even and unlabored  Cardiovascular: Peripheral pulses intact; no edema  Musculoskeletal Exam: back and leg pain    ASA Score: 1    Patient/Chart Verification  Patient ID Verified: Verbal  ID Band Applied: No  Consents Confirmed: To be obtained in the Procedural area  Interval H&P(within 24 hr) Complete (required for Outpatients and Surgery Admit only): To be obtained in the Procedural area  Allergies Reviewed: Yes  Anticoag/NSAID held?: NA  Currently on antibiotics?: No    Assessment:   1. Lumbar radiculopathy        Plan: bilateral L4 TF AMY

## 2025-03-13 NOTE — DISCHARGE INSTR - LAB
Epidural Steroid Injection   WHAT YOU NEED TO KNOW:   An epidural steroid injection (AMY) is a procedure to inject steroid medicine into the epidural space. The epidural space is between your spinal cord and vertebrae. Steroids reduce inflammation and fluid buildup in your spine that may be causing pain. You may be given pain medicine along with the steroids.          ACTIVITY  Do not drive or operate machinery today.  No strenuous activity today - bending, lifting, etc.  You may resume normal activites starting tomorrow - start slowly and as tolerated.  You may shower today, but no tub baths or hot tubs.  You may have numbness for several hours from the local anesthetic. Please use caution and common sense, especially with weight-bearing activities.    CARE OF THE INJECTION SITE  If you have soreness or pain, apply ice to the area today (20 minutes on/20 minutes off).  Starting tomorrow, you may use warm, moist heat or ice if needed.  You may have an increase or change in your discomfort for 36-48 hours after your treatment.  Apply ice and continue with any pain medication you have been prescribed.  Notify the [facility] if you have any of the following: redness, drainage, swelling, headache, stiff neck or fever above 100°F.    SPECIAL INSTRUCTIONS  Our office will contact you in approximately 14 days for a progress report.    MEDICATIONS  Continue to take all routine medications.  Our office may have instructed you to hold some medications.    As no general anesthesia was used in today's procedure, you should not experience any side effects related to anesthesia.     If you are diabetic, the steroids used in today's injection may temporarily increase your blood sugar levels after the first few days after your injection. Please keep a close eye on your sugars and alert the doctor who manages your diabetes if your sugars are significantly high from your baseline or you are symptomatic.     If you have a  problem specifically related to your procedure, please call our office at [phone].  Problems not related to your procedure should be directed to your primary care physician.

## 2025-03-27 ENCOUNTER — TELEPHONE (OUTPATIENT)
Dept: PAIN MEDICINE | Facility: CLINIC | Age: 45
End: 2025-03-27

## 2025-04-22 ENCOUNTER — OFFICE VISIT (OUTPATIENT)
Dept: PAIN MEDICINE | Facility: CLINIC | Age: 45
End: 2025-04-22
Payer: COMMERCIAL

## 2025-04-22 VITALS — HEIGHT: 70 IN | WEIGHT: 174 LBS | BODY MASS INDEX: 24.91 KG/M2

## 2025-04-22 DIAGNOSIS — M54.16 LUMBAR RADICULITIS: Primary | ICD-10-CM

## 2025-04-22 DIAGNOSIS — M47.816 LUMBAR SPONDYLOSIS: ICD-10-CM

## 2025-04-22 PROCEDURE — 99214 OFFICE O/P EST MOD 30 MIN: CPT | Performed by: ANESTHESIOLOGY

## 2025-04-22 NOTE — PROGRESS NOTES
Assessment:  1. Lumbar radiculitis    2. Lumbar spondylosis      Patient presenting for follow up visit. He has a history of chronic back pain for greater than 1 year, worsening over the past month.    Pain is likely multifactorial with components of lumbar disc displacement, lumbar spondylosis, myofascial pain, sacroiliac joint dysfunction accompanied by pain at times 7/10 on the pain scale with inability to participate in IADLs for >6 weeks.  Patient has tried Tylenol, NSAIDs, muscle relaxants, lidocaine patches, oral steroids with modest benefit.    Denies any bowel or bladder incontinence, saddle anesthesia.     In regards to the patient's  pathology, we discussed the various treatment options including physical therapy, chiropractic treatment, medication management, activity modifications, interventional spine procedures.      Independently reviewed and interpreted lumbar MRI - this showed small disc extrusions at L4-5 and L5-S1 as well as moderate right SIJ arthropathy. No significant central canal or foraminal stenosis.    After bilateral L4 TF ESIs he noticed complete improvement of his back and radiating leg pain symptoms. Pain is currently 0/10.    Plan:     Discussed we can repeat L4 TF ESIs if pain returns to a significant degree.    When patient contacts the office for repeat AMY consideration he can be directly scheduled if symptoms are recurrent and similar in distribution.    The procedures, its risks, and benefits were explained in detail to the patient. Risks include but are not limited to bleeding, infection, hematoma formation, abscess formation, weakness, headache, failure the pain to improve, nerve irritation or damage, and potential worsening of the pain.      Reviewed hemoglobin A1c, renal function, CBC and/or PT/INR prior to discussing/offering interventional modalities.     My impressions and treatment recommendations were discussed in detail with the patient who verbalized understanding  and had no further questions.  Discharge instructions were provided. I personally saw and examined the patient and I agree with the above discussed plan of care.    History of Present Illness:    Paulie Tang is a 44 y.o. male who presents for a follow up office visit in regards to Back Pain.   The patient’s current symptoms include improved back and radiating leg pain symptoms. Pain is rated 0/10 and is described as an occasional sharp, shooting pain.    I have personally reviewed and/or updated the patient's past medical history, past surgical history, family history, social history, current medications, allergies, and vital signs today.     Review of Systems   Constitutional:  Negative for chills and fever.   HENT:  Negative for ear pain and sore throat.    Eyes:  Negative for pain and visual disturbance.   Respiratory:  Negative for cough and shortness of breath.    Cardiovascular:  Negative for chest pain and palpitations.   Gastrointestinal:  Negative for abdominal pain and vomiting.   Genitourinary:  Negative for dysuria and hematuria.   Musculoskeletal:  Positive for arthralgias, back pain, gait problem and myalgias.   Skin:  Negative for color change and rash.   Neurological:  Negative for seizures and syncope.   All other systems reviewed and are negative.      Patient Active Problem List   Diagnosis    Myofascial pain    Sacroiliac joint dysfunction    Segmental dysfunction of lumbar region    Segmental dysfunction of thoracic region    Right lumbar radiculitis       History reviewed. No pertinent past medical history.    History reviewed. No pertinent surgical history.    History reviewed. No pertinent family history.    Social History     Occupational History    Not on file   Tobacco Use    Smoking status: Every Day     Current packs/day: 1.00     Types: Cigarettes     Passive exposure: Current    Smokeless tobacco: Never   Vaping Use    Vaping status: Never Used   Substance and Sexual Activity     "Alcohol use: Yes    Drug use: Yes     Types: Marijuana    Sexual activity: Not on file       Current Outpatient Medications on File Prior to Visit   Medication Sig    cyclobenzaprine (FLEXERIL) 5 mg tablet Take 1 tablet (5 mg total) by mouth daily at bedtime    diclofenac (VOLTAREN) 75 mg EC tablet Take 1 tablet (75 mg total) by mouth every 12 (twelve) hours as needed (for moderate to severe pain of low back)    lidocaine (LIDODERM) 5 % Apply 1 patch topically over 12 hours daily Remove & Discard patch within 12 hours or as directed by MD     No current facility-administered medications on file prior to visit.       No Known Allergies    Physical Exam:    Ht 5' 10\" (1.778 m)   Wt 78.9 kg (174 lb)   BMI 24.97 kg/m²     Constitutional:normal, well developed, well nourished, alert, in no distress and non-toxic and no overt pain behavior.  Eyes:anicteric  HEENT:grossly intact  Neck:supple, symmetric, trachea midline and no masses   Pulmonary:even and unlabored  Cardiovascular:No edema or pitting edema present  Skin:Normal without rashes or lesions and well hydrated  Psychiatric:Mood and affect appropriate  Neurologic: Motor function is grossly intact with no focal neurologic deficits     Imaging    "

## 2025-05-07 ENCOUNTER — TELEPHONE (OUTPATIENT)
Age: 45
End: 2025-05-07

## 2025-05-07 DIAGNOSIS — M54.16 LUMBAR RADICULOPATHY: Primary | ICD-10-CM

## 2025-05-07 RX ORDER — METHYLPREDNISOLONE 4 MG/1
TABLET ORAL
Qty: 21 TABLET | Refills: 0 | Status: SHIPPED | OUTPATIENT
Start: 2025-05-07

## 2025-05-07 NOTE — TELEPHONE ENCOUNTER
Caller: pt    Doctor: Dr. madden    Reason for call: pt is in a lot of pain and would like to get another injection.    Call back#: [phone]

## 2025-05-07 NOTE — TELEPHONE ENCOUNTER
Repeat Injections  Injection type:L4 TFESI  Last injection date:3/13/15  Last office visit: 4/22/25  Where is pain located: [place] Eduardo lower back  Is the pain the same area as before: Yes  Current pain level:8/10  How much % of relief did the last injection provide:100%  Duration of relief from last injection:Lasted approx 2 months  When did pain return:Saturday 5/3  Is the pain effecting your ADLs: Yes    Blood thinners/NSAIDS? N/A  Diabetes? No

## 2025-05-07 NOTE — TELEPHONE ENCOUNTER
Pt is requesting to repeat L4 TFESI and is aware injections are normally scheduled 3 months apart. Please advise if ptcan repeat at 3 month luanne?    Pt is also requesting a steroid dose pack to help with his pain until he can have a procedure? Pt is not Diabetic and last dose pack was on 3/4/25.    Per pt ok to leave a detailed message if unable to reach him.